# Patient Record
Sex: MALE | Race: WHITE | NOT HISPANIC OR LATINO | Employment: FULL TIME | URBAN - METROPOLITAN AREA
[De-identification: names, ages, dates, MRNs, and addresses within clinical notes are randomized per-mention and may not be internally consistent; named-entity substitution may affect disease eponyms.]

---

## 2023-09-18 ENCOUNTER — OCCMED (OUTPATIENT)
Dept: URGENT CARE | Facility: CLINIC | Age: 33
End: 2023-09-18
Payer: OTHER MISCELLANEOUS

## 2023-09-18 ENCOUNTER — APPOINTMENT (OUTPATIENT)
Dept: RADIOLOGY | Facility: CLINIC | Age: 33
End: 2023-09-18
Payer: OTHER MISCELLANEOUS

## 2023-09-18 DIAGNOSIS — S86.911A KNEE STRAIN, RIGHT, INITIAL ENCOUNTER: ICD-10-CM

## 2023-09-18 DIAGNOSIS — S86.911A KNEE STRAIN, RIGHT, INITIAL ENCOUNTER: Primary | ICD-10-CM

## 2023-09-18 PROCEDURE — 99283 EMERGENCY DEPT VISIT LOW MDM: CPT | Performed by: NURSE PRACTITIONER

## 2023-09-18 PROCEDURE — G0382 LEV 3 HOSP TYPE B ED VISIT: HCPCS | Performed by: NURSE PRACTITIONER

## 2023-09-18 PROCEDURE — 73564 X-RAY EXAM KNEE 4 OR MORE: CPT

## 2023-10-12 ENCOUNTER — OCCMED (OUTPATIENT)
Dept: URGENT CARE | Facility: CLINIC | Age: 33
End: 2023-10-12
Payer: OTHER MISCELLANEOUS

## 2023-10-12 DIAGNOSIS — S83.91XD SPRAIN OF RIGHT KNEE, UNSPECIFIED LIGAMENT, SUBSEQUENT ENCOUNTER: Primary | ICD-10-CM

## 2023-10-12 PROCEDURE — 99213 OFFICE O/P EST LOW 20 MIN: CPT | Performed by: NURSE PRACTITIONER

## 2023-10-19 ENCOUNTER — OFFICE VISIT (OUTPATIENT)
Dept: OBGYN CLINIC | Facility: CLINIC | Age: 33
End: 2023-10-19

## 2023-10-19 VITALS
DIASTOLIC BLOOD PRESSURE: 93 MMHG | SYSTOLIC BLOOD PRESSURE: 138 MMHG | WEIGHT: 315 LBS | HEIGHT: 72 IN | HEART RATE: 97 BPM | BODY MASS INDEX: 42.66 KG/M2

## 2023-10-19 DIAGNOSIS — M22.8X1 MALTRACKING OF RIGHT PATELLA: Primary | ICD-10-CM

## 2023-10-19 DIAGNOSIS — M84.351A STRESS FRACTURE OF RIGHT FEMUR, INITIAL ENCOUNTER: ICD-10-CM

## 2023-10-20 NOTE — ASSESSMENT & PLAN NOTE
Patient's history and MRI indicate that he may have suffered a patellar dislocation during his fall.    -Weightbearing as tolerated right lower extremity  -Continue wearing knee sleeve for comfort  -Recommend physical therapy to address lower extremity mechanics  -Patient informed that if his knee continues to suffer bouts of instability, he may require surgical intervention  -He may follow-up as needed

## 2023-10-20 NOTE — PROGRESS NOTES
Date: 10/19/23  Ana Luisa Floyd   MRN# [id number]  : 1990      Chief Complaint: Right knee pain    Assessment and Plan:    Maltracking of right patella  Patient's history and MRI indicate that he may have suffered a patellar dislocation during his fall.    -Weightbearing as tolerated right lower extremity  -Continue wearing knee sleeve for comfort  -Recommend physical therapy to address lower extremity mechanics  -Patient informed that if his knee continues to suffer bouts of instability, he may require surgical intervention  -He may follow-up as needed    Stress fracture of right femur  MRI of the knee reveals significant bony edema to both the patella and distal lateral femur. The patella has a hairline crack indicating a stress fracture    -This fracture will heal on its own with proper rest and rehabilitation  -Tylenol as needed. Do not exceed 3000 mg daily  -Over-the-counter NSAIDs as needed  -Continue knee sleeve for comfort  -Activity modification to limit strain on the joint  -Rest, ice and elevation as needed  -Patient may follow-up as needed         Subjective:     Knee Pain  Patient complains of right knee pain. This is evaluated as a workers compensation injury. The pain began 1 week ago after a fall at work. The patient does not complain of any knee pain at this time, though he did note at the time of the injury his kneecap appeared to dislocate laterally and then relocate. The knee has given out or felt unstable. The patient can bend and straighten the knee fully. Treatment to date has been ice, Tylenol, knee sleeve/brace, with significant relief. Allergy:  No Known Allergies  Medications:  all current active meds have been reviewed  Past Medical History:  History reviewed. No pertinent past medical history. Past Surgical History:  History reviewed. No pertinent surgical history.   Family History:  Family History   Problem Relation Age of Onset    No Known Problems Mother     No Known Problems Father      Social History:  Social History     Substance and Sexual Activity   Alcohol Use None     Social History     Substance and Sexual Activity   Drug Use Not on file     Social History     Tobacco Use   Smoking Status Never    Passive exposure: Never   Smokeless Tobacco Never           ROS:   Review of Systems   All other systems reviewed and are negative. Objective:   BP Readings from Last 1 Encounters:   10/19/23 138/93      Wt Readings from Last 1 Encounters:   10/19/23 (!) 161 kg (355 lb)      Pulse Readings from Last 1 Encounters:   10/19/23 97      BMI: Estimated body mass index is 48.15 kg/m² as calculated from the following:    Height as of this encounter: 6' (1.829 m). Weight as of this encounter: 161 kg (355 lb). Physical Exam  Constitutional:       General: He is not in acute distress. HENT:      Head: Normocephalic and atraumatic. Eyes:      Conjunctiva/sclera: Conjunctivae normal.   Cardiovascular:      Comments: Extremities well perfused   No LE edema    Pulmonary:      Effort: Pulmonary effort is normal.   Neurological:      Mental Status: He is alert. Mental status is at baseline. Gait and Station:   stiff-legged    Right knee: Inspection:  ecchymosis, erythema, and edema    Overall limb alignment: neutral    Effusion: minimal    ROM 0 to 120 without pain    Extensor Lag: Absent    Palpation: Mild anterior tenderness     stable to AP translation at 90 deg    Coronal plane stable in full extension    Coronal plane stable in mid-flexion     Motor: 5/5 EHL/FHL/TA/GS/Qd/Hs    Vascular: Toes WWP with BCR    Sensory: SILT DP/SP/Alexandrea/Saph/Tib    Images:    I personally reviewed relevant images in the PACS system and my interpretation is as follows:  X-rays of the right knee reveal minimal degenerative changes, no apparent fractures or dislocations  MRI of the right knee reveals significant edema in both the distal lateral femur and patella.   Is evidence of an apparent stress fracture to the patella which is nondisplaced. The MPFL does appear attenuated and the patella is sitting in a lateral station in the trochlea.         Inga Gill MD  Adult Reconstruction Specialist   [facility]

## 2023-11-02 ENCOUNTER — EVALUATION (OUTPATIENT)
Dept: PHYSICAL THERAPY | Facility: CLINIC | Age: 33
End: 2023-11-02
Payer: OTHER MISCELLANEOUS

## 2023-11-02 DIAGNOSIS — M22.8X1 MALTRACKING OF RIGHT PATELLA: ICD-10-CM

## 2023-11-02 DIAGNOSIS — M84.351A STRESS FRACTURE OF RIGHT FEMUR, INITIAL ENCOUNTER: Primary | ICD-10-CM

## 2023-11-02 PROCEDURE — 97161 PT EVAL LOW COMPLEX 20 MIN: CPT | Performed by: PHYSICAL THERAPIST

## 2023-11-02 PROCEDURE — 97110 THERAPEUTIC EXERCISES: CPT | Performed by: PHYSICAL THERAPIST

## 2023-11-02 NOTE — PROGRESS NOTES
PT Evaluation     Today's date: 2023  Patient name: Chiki Polanco  : 1990  MRN: [id number]  Referring provider: Princess Caceres MD  Dx:   Encounter Diagnosis     ICD-10-CM    1. Stress fracture of right femur, initial encounter  M84.351A Ambulatory Referral to Physical Therapy      2. Maltracking of right patella  M22.8X1 Ambulatory Referral to Physical Therapy                     Assessment  Assessment details: Chiki Polanco is a 35 y.o. male presenting to physical therapy with weakness and instability of the right knee and presents with decreased strength and decreased activity tolerance. Assessment reveals decreased quad strength and weakness in single leg push positions (Stairs) with significant dynamic valgus. Secondary to these impairments, patient has increased difficulty performing ADL's, household chores, and  work related tasks. Bhandari would benefit from skilled PT to address these issues and maximize function. Thank you for the referral.    Impairments: abnormal gait, abnormal muscle tone, abnormal or restricted ROM, abnormal movement, activity intolerance, impaired balance, impaired physical strength, lacks appropriate home exercise program, pain with function and weight-bearing intolerance  Understanding of Dx/Px/POC: excellent  Goals  STG (2 weeks)  1. Patient will be independent with HEP  2. Decrease pain at worst by 2 points on NPRS  3. Increase right knee strength for pain free sit to stands  LTG (4 weeks)  1. Decrease pain at worst from 4 points on NPRS  2. Increase right knee strength for pain free stair navigation  3. Patient will demonstrate ability to perform stair navigation with normal mechanics  4.  Increase FOTO score > or equal to expected outcome    Plan  Patient would benefit from: skilled PT  Planned therapy interventions: joint mobilization, manual therapy, neuromuscular re-education, patient education, strengthening, stretching, therapeutic exercise, home exercise program, ADL training and balance  Frequency: 2x week  Duration in weeks: 4  Treatment plan discussed with: patient        Subjective Evaluation    History of Present Illness  Mechanism of injury: Patient reports to outpatient PT secondary to the onset of right knee pain and discomfort which began stepping down from a conveyor which occurred at work about 1 month prior. An MRI was performed secondary to instability and demonstrates bony edema to both the patella and distal lateral femur with the patella having a hairline crack indicating a stress fracture per ortho. Patient states that the pain has mostly resolved but notes weakness and difficulty with stair navigation, going up and down. 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 Notes that work has him on light duty at this time. Patient does note a history of knee pain and discomfort on the right but required no surgeries at that time. Patients goals for PT are to decrease the pain and return to PLOF.             Not a recurrent problem   Quality of life: good    Patient Goals  Patient goals for therapy: increased strength, independence with ADLs/IADLs, return to sport/leisure activities, increased motion and decreased pain    Pain  Current pain ratin  At best pain ratin  At worst pain ratin  Aggravating factors: stair climbing  Progression: improved    Social Support  Steps to enter house: yes  Stairs in house: no   [facility]    Employment status: working  Hand dominance: right      Diagnostic Tests  MRI studies: abnormal  Treatments  No previous or current treatments        Objective     Active Range of Motion     Right Knee   Normal active range of motion    Mobility   Patellar Mobility:     Right Knee   WFL: medial, lateral, superior and inferior    Patellar Static Positioning   Right Knee: lateral tilt    Strength/Myotome Testing     Left Knee   Flexion: 5  Extension: 5    Right Knee   Flexion: 4+  Extension: 4+    Additional Strength Details  Hip abduction: R = 4-/5, L = 4+/5    Tests     Right Knee   Negative anterior drawer, lateral Beata, medial Beata, patellar apprehension, patellar compression, patella-femoral grind, valgus stress test at 0 degrees and varus stress test at 0 degrees.      Additional Tests Details  (+) Dynamic valgus with squat and stair navigation    Ambulation     Ambulation: Stairs   Ascend stairs: independent  Pattern: reciprocal  Railings: without rails  Descend stairs: independent  Pattern: reciprocal  Railings: without rails    Additional Stairs Ambulation Details  Contralateral vaulting to limit push on the right LE while ascending and loss of eccentric control on the right while descending             Precautions: BMI > 40      Manuals 11/2                                                                Neuro Re-Ed                                                                                                        Ther Ex             Recumbent bike             Quad isometrics (into wall) 2x10 x5"            Standing hip abduction - green 3x10 B/L            Abduction squats into chair 3x5            Leg press w/ VMO ball             Monster walks                                       Ther Activity                                       Gait Training                                       Modalities

## 2023-11-03 ENCOUNTER — TELEPHONE (OUTPATIENT)
Age: 33
End: 2023-11-03

## 2023-11-03 NOTE — TELEPHONE ENCOUNTER
Caller: Thony    Doctor: Sunshine Argueta    Reason for call: Needs a work note releasing him from light duty     Call back#: [phone]

## 2023-11-06 ENCOUNTER — OFFICE VISIT (OUTPATIENT)
Dept: PHYSICAL THERAPY | Facility: CLINIC | Age: 33
End: 2023-11-06
Payer: OTHER MISCELLANEOUS

## 2023-11-06 DIAGNOSIS — M84.351A STRESS FRACTURE OF RIGHT FEMUR, INITIAL ENCOUNTER: Primary | ICD-10-CM

## 2023-11-06 DIAGNOSIS — M22.8X1 MALTRACKING OF RIGHT PATELLA: ICD-10-CM

## 2023-11-06 PROCEDURE — 97110 THERAPEUTIC EXERCISES: CPT

## 2023-11-06 NOTE — PROGRESS NOTES
Daily Note     Today's date: 2023  Patient name: Jayro Bentley  : 1990  MRN: [id number]  Referring provider: Hayes Campbell MD  Dx:   Encounter Diagnosis     ICD-10-CM    1. Stress fracture of right femur, initial encounter  M84.351A       2. Maltracking of right patella  M22.8X1           Start Time: 0900  Stop Time: 0940  Total time in clinic (min): 40 minutes      Subjective: Patient reported having no questions or concerns regarding HEP. Continued light duty at work. Objective: See treatment diary below. Program progressed as reflected below. Assessment: Demonstrates proximal weakness through hip abductors and glute med with cues provided to improve control with monster walks. No reported increase in pain throughout TE. With presence of weakness and decrease in proximal stability, patient would benefit from continued skilled PT to further promote increasing strength to protect knee with work functions and avoid risk of re-injury. Plan: Continue per plan of care. Progress treatment as tolerated.            Precautions: BMI > 40    Manuals                                                                Neuro Re-Ed                                                                                                        Ther Ex             Recumbent bike  Upright  5 min  L1           Quad isometrics (into wall) 2x10 x5" Into ball  5" hold,  3x10           Standing hip abduction - green 3x10 B/L 3x10  bilat           Abduction squats into chair 3x5 Purple  3x5           Leg press w/ VMO ball  185#  3x15           Monster walks  Purple  2 rounds to fatigue                                     Ther Activity                                       Gait Training                                       Modalities

## 2023-11-07 ENCOUNTER — TELEPHONE (OUTPATIENT)
Age: 33
End: 2023-11-07

## 2023-11-07 ENCOUNTER — OFFICE VISIT (OUTPATIENT)
Dept: PHYSICAL THERAPY | Facility: CLINIC | Age: 33
End: 2023-11-07
Payer: OTHER MISCELLANEOUS

## 2023-11-07 DIAGNOSIS — M84.351A STRESS FRACTURE OF RIGHT FEMUR, INITIAL ENCOUNTER: Primary | ICD-10-CM

## 2023-11-07 DIAGNOSIS — M22.8X1 MALTRACKING OF RIGHT PATELLA: ICD-10-CM

## 2023-11-07 PROCEDURE — 97110 THERAPEUTIC EXERCISES: CPT

## 2023-11-07 NOTE — TELEPHONE ENCOUNTER
Pt would like a note placed stating he is cleared to return to work full duty starting tomorrow 11/8/23. Pt is not having any pain, no swelling. Is going to PT and is doing well.     Please attach pt to letter (will be accessing via [email or web address])    Callback: [phone]

## 2023-11-07 NOTE — PROGRESS NOTES
Daily Note     Today's date: 2023  Patient name: Sarah Kathleen  : 1990  MRN: [id number]  Referring provider: William Casiano MD  Dx:   Encounter Diagnosis     ICD-10-CM    1. Stress fracture of right femur, initial encounter  M84.351A       2. Maltracking of right patella  M22.8X1           Start Time: 0820  Stop Time: 0900  Total time in clinic (min): 40 minutes      Subjective: Patient denied any soreness following session yesterday. Does have some difficulty with descending stairs at times. Objective: See treatment diary below. Purple TB issued for monster walks at home. Assessment: Continued to deny pain through program. Improved control with hip abductor and glute med strengthening. Eccentric controlled focus step added to work on improving quad control with descending stairs. Making good progress through skilled PT at this time and will continue to progress to ensure avoiding any risk of re-injury. Plan: Continue per plan of care. Progress treatment as tolerated.            Precautions: BMI > 40    Manuals                                                               Neuro Re-Ed                                                                                                        Ther Ex             Recumbent bike  Upright  5 min  L1 Upright  5 min  L1          Quad isometrics (into wall) 2x10 x5" Into ball  5" hold,  3x10 Into ball  5" hold,  3x10          Standing hip abduction - green 3x10 B/L 3x10  bilat 3x10          Abduction squats into chair 3x5 Purple  3x5 Purple  3x7          Leg press w/ VMO ball  185#  3x15 205#  3x15          Monster walks  Purple  2 rounds to fatigue Purple  2 rounds to fatigue          Fwd step up/down   Eccentric  6"  2x10                       Ther Activity                                       Gait Training                                       Modalities

## 2023-11-08 ENCOUNTER — TELEPHONE (OUTPATIENT)
Age: 33
End: 2023-11-08

## 2023-11-08 NOTE — TELEPHONE ENCOUNTER
Caller: Gilbert    Doctor: Tali Herron    Reason for call: calling for office notes and work note to be faxed over    Fax # [phone]     office notes and work stat were electonically faxed    Call back#: [phone]

## 2023-11-15 ENCOUNTER — OFFICE VISIT (OUTPATIENT)
Dept: PHYSICAL THERAPY | Facility: CLINIC | Age: 33
End: 2023-11-15
Payer: OTHER MISCELLANEOUS

## 2023-11-15 DIAGNOSIS — M22.8X1 MALTRACKING OF RIGHT PATELLA: ICD-10-CM

## 2023-11-15 DIAGNOSIS — M84.351A STRESS FRACTURE OF RIGHT FEMUR, INITIAL ENCOUNTER: Primary | ICD-10-CM

## 2023-11-15 PROCEDURE — 97112 NEUROMUSCULAR REEDUCATION: CPT | Performed by: PHYSICAL THERAPIST

## 2023-11-15 PROCEDURE — 97140 MANUAL THERAPY 1/> REGIONS: CPT | Performed by: PHYSICAL THERAPIST

## 2023-11-15 NOTE — PROGRESS NOTES
Daily Note     Today's date: 11/15/2023  Patient name: Guillermo Iverson  : 1990  MRN: [id number]  Referring provider: Inga Gill MD  Dx:   Encounter Diagnosis     ICD-10-CM    1. Stress fracture of right femur, initial encounter  M84.351A       2. Maltracking of right patella  M22.8X1                          Subjective: Patient reports no soreness and states that overall he has been doing well. Patient notes only moderate lumbar soreness from resumption of lifting. Patient has returned to full duty last week without leg soreness. Objective: See treatment diary below. Assessment: Patient demonstrates significant improvement in squatting/lifting and stair step downs. Per improvement in tolerance via reps, began to increase resistance with fair overall tolerance. Mild loss of eccentric control noted with 6" step downs and compensatory hip ER but able to complete without pain. Plan: Continue per plan of care. Progress treatment as tolerated.            Precautions: BMI > 40    Manuals 11/2 11/6 11/7 11/15                                                             Neuro Re-Ed             SLS balance    3x30         SLS y balance    2 sets of 5         Lifting review with minesh    NV                                                             Ther Ex             Recumbent bike  Upright  5 min  L1 Upright  5 min  L1 Upright L1 5'         Quad isometrics (into wall) 2x10 x5" Into ball  5" hold,  3x10 Into ball  5" hold,  3x10 2x10 x5"         Standing hip abduction - blue 3x10 B/L 3x10  bilat 3x10 3x10         Abduction squats into chair 3x5 Purple  3x5 Purple  3x7 Purple 3x8         Leg press w/ VMO ball  185#  3x15 205#  3x15 205# 3x15         Monster walks - purple  Purple  2 rounds to fatigue Purple  2 rounds to fatigue X2 sets to fatigue         Fwd step up/down   Eccentric  6"  2x10 6" 2x10 overs                      Ther Activity                                       Gait Training                                       Modalities

## 2023-11-16 ENCOUNTER — OFFICE VISIT (OUTPATIENT)
Dept: PHYSICAL THERAPY | Facility: CLINIC | Age: 33
End: 2023-11-16
Payer: OTHER MISCELLANEOUS

## 2023-11-16 DIAGNOSIS — M84.351A STRESS FRACTURE OF RIGHT FEMUR, INITIAL ENCOUNTER: Primary | ICD-10-CM

## 2023-11-16 DIAGNOSIS — M22.8X1 MALTRACKING OF RIGHT PATELLA: ICD-10-CM

## 2023-11-16 PROCEDURE — 97140 MANUAL THERAPY 1/> REGIONS: CPT | Performed by: PHYSICAL THERAPIST

## 2023-11-16 PROCEDURE — 97112 NEUROMUSCULAR REEDUCATION: CPT | Performed by: PHYSICAL THERAPIST

## 2023-11-16 PROCEDURE — 97110 THERAPEUTIC EXERCISES: CPT | Performed by: PHYSICAL THERAPIST

## 2023-11-16 NOTE — PROGRESS NOTES
Daily Note     Today's date: 2023  Patient name: Guillermo Iverson  : 1990  MRN: [id number]  Referring provider: Inga Gill MD  Dx:   Encounter Diagnosis     ICD-10-CM    1. Stress fracture of right femur, initial encounter  M84.351A       2. Maltracking of right patella  M22.8X1                            Subjective: Patient states that he has been compliant with his HEP. Notes some discomfort from prolonged sitting and back discomfort from lifting. Objective: See treatment diary below. Assessment: Patient continues to demonstrate steady improvement in functional mobility including step downs on 6" steps with decreased compensation and loss of eccentric control. No LOB present with dynamic SLS. Remains challenged by single leg abduction exercises but overall is progressing well to decrease dynamic valgus. Plan: Continue per plan of care. Progress treatment as tolerated.            Precautions: BMI > 40    Manuals 11/2 11/6 11/7 11/15 11/16                                                            Neuro Re-Ed             SLS balance    3x30 3x30        SLS y balance    2 sets of 5 2 sets of 5        Lifting review with minesh    NV 30# 2x5                                                            Ther Ex             Recumbent bike  Upright  5 min  L1 Upright  5 min  L1 Upright L1 5' L1 5'        Quad isometrics (into wall) 2x10 x5" Into ball  5" hold,  3x10 Into ball  5" hold,  3x10 2x10 x5" 2x10 x5"        Standing hip abduction - blue 3x10 B/L 3x10  bilat 3x10 3x10 3x10        Abduction squats into chair 3x5 Purple  3x5 Purple  3x7 Purple 3x8 Purple 2x8        Leg press w/ VMO ball  185#  3x15 205#  3x15 205# 3x15 205# 3x15        Monster walks - purple  Purple  2 rounds to fatigue Purple  2 rounds to fatigue X2 sets to fatigue X2 sets to fatigue        Fwd step up/down   Eccentric  6"  2x10 6" 2x10 overs 6" 2x10 overs                     Ther Activity Gait Training                                       Modalities

## 2023-11-17 ENCOUNTER — TELEPHONE (OUTPATIENT)
Dept: OBGYN CLINIC | Facility: HOSPITAL | Age: 33
End: 2023-11-17

## 2023-11-17 NOTE — TELEPHONE ENCOUNTER
Caller: Coni    Doctor: Rajesh Lam    Reason for call: confirming pts dx and note that was placed that he can return to work    Call back#: n/a

## 2023-11-20 ENCOUNTER — OFFICE VISIT (OUTPATIENT)
Dept: PHYSICAL THERAPY | Facility: CLINIC | Age: 33
End: 2023-11-20
Payer: OTHER MISCELLANEOUS

## 2023-11-20 DIAGNOSIS — M84.351A STRESS FRACTURE OF RIGHT FEMUR, INITIAL ENCOUNTER: Primary | ICD-10-CM

## 2023-11-20 DIAGNOSIS — M22.8X1 MALTRACKING OF RIGHT PATELLA: ICD-10-CM

## 2023-11-20 PROCEDURE — 97112 NEUROMUSCULAR REEDUCATION: CPT

## 2023-11-20 PROCEDURE — 97110 THERAPEUTIC EXERCISES: CPT

## 2023-11-20 NOTE — PROGRESS NOTES
Daily Note     Today's date: 2023  Patient name: Fransisca Lara  : 1990  MRN: [id number]  Referring provider: Marichuy Rseendez MD  Dx:   Encounter Diagnosis     ICD-10-CM    1. Stress fracture of right femur, initial encounter  M84.351A       2. Maltracking of right patella  M22.8X1               Start Time: 0800  Stop Time: 0840  Total time in clinic (min): 40 minutes      Subjective: Patient reported aggravating his LB trying to lift cases of water that fell at work and was aware his mechanics were off. His mattress also plays into these symptoms. Otherwise his knee is doing well. Objective: See treatment diary below. Assessment: More limited by LB vs knee pain. Good stability with no LOB demonstrated during SL stance. Decreasing dynamic valgus and improving eccentric control. Due to LB pain, held on performing lifting mechanics but did review form with patient. Plan: Continue per plan of care. Progress treatment as tolerated.            Precautions: BMI > 40    Manuals 11/2 11/6 11/7 11/15 11/16 11/20                                                           Neuro Re-Ed             SLS balance    3x30 3x30 30"x3       SLS y balance    2 sets of 5 2 sets of 5 2x5       Lifting review with minesh    NV 30# 2x5 Reviewed, held                                                           Ther Ex             Recumbent bike  Upright  5 min  L1 Upright  5 min  L1 Upright L1 5' L1 5' 5 min  L1       Quad isometrics (into wall) 2x10 x5" Into ball  5" hold,  3x10 Into ball  5" hold,  3x10 2x10 x5" 2x10 x5" 5" hold,  2x10       Standing hip abduction - blue 3x10 B/L 3x10  bilat 3x10 3x10 3x10 3x10       Abduction squats into chair 3x5 Purple  3x5 Purple  3x7 Purple 3x8 Purple 2x8 Purple  2x8       Leg press w/ VMO ball  185#  3x15 205#  3x15 205# 3x15 205# 3x15 225#  3x15       Monster walks - purple  Purple  2 rounds to fatigue Purple  2 rounds to fatigue X2 sets to fatigue X2 sets to fatigue x2 sets to fatigue       Fwd step up/down   Eccentric  6"  2x10 6" 2x10 overs 6" 2x10 overs Up and over   6"  2x10                    Ther Activity                                       Gait Training                                       Modalities

## 2023-11-21 ENCOUNTER — EVALUATION (OUTPATIENT)
Dept: PHYSICAL THERAPY | Facility: CLINIC | Age: 33
End: 2023-11-21
Payer: OTHER MISCELLANEOUS

## 2023-11-21 DIAGNOSIS — M84.351A STRESS FRACTURE OF RIGHT FEMUR, INITIAL ENCOUNTER: Primary | ICD-10-CM

## 2023-11-21 DIAGNOSIS — M22.8X1 MALTRACKING OF RIGHT PATELLA: ICD-10-CM

## 2023-11-21 PROCEDURE — 97140 MANUAL THERAPY 1/> REGIONS: CPT | Performed by: PHYSICAL THERAPIST

## 2023-11-21 PROCEDURE — 97110 THERAPEUTIC EXERCISES: CPT | Performed by: PHYSICAL THERAPIST

## 2023-11-21 NOTE — PROGRESS NOTES
PT Re-Evaluation  and PT Discharge    Today's date: 2023  Patient name: Ayaan Tavares  : 1990  MRN: [id number]  Referring provider: Nithya Jimenez MD  Dx:   Encounter Diagnosis     ICD-10-CM    1. Stress fracture of right femur, initial encounter  M84.351A       2. Maltracking of right patella  M22.8X1                      Assessment  Assessment details: Patient is a 35y.o. year old male who attended physical therapy for 7 treatment sessions regarding weakness and instability of the right knee. Patient reports >100% improvement at this time which correlates with FOTO scoring. Patient has shown improvement throughout PT by demonstrating decreased pain, increased range of motion, increased strength, improved tolerance to activity, and improved gait/balance. Secondary to achieving functional goals and independence with comprehensive Home Exercise Program, Chi Celaya will be discharged from PT at this time. Thank you. Impairments: abnormal gait, abnormal muscle tone, abnormal or restricted ROM, abnormal movement, activity intolerance, impaired balance, impaired physical strength, lacks appropriate home exercise program, pain with function and weight-bearing intolerance  Understanding of Dx/Px/POC: excellent  Goals  STG (2 weeks)  1. Patient will be independent with HEP (MET)  2. Decrease pain at worst by 2 points on NPRS (MET)  3. Increase right knee strength for pain free sit to stands (MET)  LTG (4 weeks)  1. Decrease pain at worst from 4 points on NPRS (MET)  2. Increase right knee strength for pain free stair navigation (MET)  3. Patient will demonstrate ability to perform stair navigation with normal mechanics (MET)  4.  Increase FOTO score > or equal to expected outcome (MET)    Plan  Patient would benefit from: skilled PT  Planned therapy interventions: joint mobilization, manual therapy, neuromuscular re-education, patient education, strengthening, stretching, therapeutic exercise, home exercise program, ADL training and balance  Treatment plan discussed with: patient    Subjective Evaluation    History of Present Illness  Mechanism of injury: Patient reports to outpatient PT secondary to the onset of right knee pain and discomfort which began stepping down from a conveyor which occurred at work about 1 month prior. An MRI was performed secondary to instability and demonstrates bony edema to both the patella and distal lateral femur with the patella having a hairline crack indicating a stress fracture per ortho. Patient states that the pain has mostly resolved but notes weakness and difficulty with stair navigation, going up and down. 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 Notes that work has him on light duty at this time. Patient does note a history of knee pain and discomfort on the right but required no surgeries at that time. Patients goals for PT are to decrease the pain and return to PLOF.      2023: Patient reports continued HEP compliance and states that his knee is no longer painful with all tasks including squat and stair navigation. Patient has been compliant with his HEP and is only limited functionally by a recent back pain from lifting.            Not a recurrent problem   Quality of life: good    Patient Goals  Patient goals for therapy: increased strength, independence with ADLs/IADLs, return to sport/leisure activities, increased motion and decreased pain    Pain  Current pain ratin  At best pain ratin  At worst pain ratin  Aggravating factors: stair climbing  Progression: improved    Social Support  Steps to enter house: yes  Stairs in house: no   [facility]    Employment status: working  Hand dominance: right      Diagnostic Tests  MRI studies: abnormal  Treatments  No previous or current treatments    Objective     Active Range of Motion     Right Knee   Normal active range of motion    Mobility   Patellar Mobility:     Right Knee   WFL: medial, lateral, superior and inferior    Patellar Static Positioning   Right Knee: lateral tilt    Strength/Myotome Testing     Left Knee   Flexion: 5  Extension: 5    Right Knee   Flexion: 5  Extension: 5    Additional Strength Details  Hip abduction: R = 4+/5, L = 4+/5    Tests     Right Knee   Negative anterior drawer, lateral Beata, medial Beata, patellar apprehension, patellar compression, patella-femoral grind, valgus stress test at 0 degrees and varus stress test at 0 degrees.      Additional Tests Details  (+) Dynamic valgus with squat and stair navigation (RESOLVED)    Ambulation     Ambulation: Stairs   Ascend stairs: independent  Pattern: reciprocal  Railings: without rails  Descend stairs: independent  Pattern: reciprocal  Railings: without rails    Additional Stairs Ambulation Details  Contralateral vaulting to limit push on the right LE while ascending and loss of eccentric control on the right while descending           Precautions: BMI > 40    Manuals 11/2 11/6 11/7 11/15 11/16 11/20 11/21      Re-eval       15'                                             Neuro Re-Ed             SLS balance    3x30 3x30 30"x3 3x30"      SLS y balance    2 sets of 5 2 sets of 5 2x5 2x5      Lifting review with minesh    NV 30# 2x5 Reviewed, held Reviewed                                                          Ther Ex             Recumbent bike  Upright  5 min  L1 Upright  5 min  L1 Upright L1 5' L1 5' 5 min  L1 L1 5'      Quad isometrics (into wall) 2x10 x5" Into ball  5" hold,  3x10 Into ball  5" hold,  3x10 2x10 x5" 2x10 x5" 5" hold,  2x10       Standing hip abduction - blue 3x10 B/L 3x10  bilat 3x10 3x10 3x10 3x10       Abduction squats into chair 3x5 Purple  3x5 Purple  3x7 Purple 3x8 Purple 2x8 Purple  2x8       Leg press w/ VMO ball  185#  3x15 205#  3x15 205# 3x15 205# 3x15 225#  3x15       Monster walks - purple  Purple  2 rounds to fatigue Purple  2 rounds to fatigue X2 sets to fatigue X2 sets to fatigue x2 sets to fatigue       Fwd step up/down   Eccentric  6"  2x10 6" 2x10 overs 6" 2x10 overs Up and over   6"  2x10       HEP Review       10'      Ther Activity                                       Gait Training                                       Modalities